# Patient Record
Sex: FEMALE | Race: BLACK OR AFRICAN AMERICAN | Employment: FULL TIME | URBAN - METROPOLITAN AREA
[De-identification: names, ages, dates, MRNs, and addresses within clinical notes are randomized per-mention and may not be internally consistent; named-entity substitution may affect disease eponyms.]

---

## 2020-10-21 ENCOUNTER — HOSPITAL ENCOUNTER (EMERGENCY)
Age: 35
Discharge: HOME | End: 2020-10-21
Payer: MEDICAID

## 2020-10-21 VITALS
SYSTOLIC BLOOD PRESSURE: 158 MMHG | OXYGEN SATURATION: 97 % | DIASTOLIC BLOOD PRESSURE: 95 MMHG | BODY MASS INDEX: 33.38 KG/M2 | WEIGHT: 170 LBS | RESPIRATION RATE: 16 BRPM | TEMPERATURE: 98.4 F | HEART RATE: 102 BPM | HEIGHT: 60 IN

## 2020-10-21 PROCEDURE — 99282 EMERGENCY DEPT VISIT SF MDM: CPT

## 2020-10-21 PROCEDURE — 6360000002 HC RX W HCPCS: Performed by: PHYSICIAN ASSISTANT

## 2020-10-21 PROCEDURE — 96372 THER/PROPH/DIAG INJ SC/IM: CPT

## 2020-10-21 RX ORDER — TRIAMCINOLONE ACETONIDE 40 MG/ML
40 INJECTION, SUSPENSION INTRA-ARTICULAR; INTRAMUSCULAR ONCE
Status: COMPLETED | OUTPATIENT
Start: 2020-10-21 | End: 2020-10-21

## 2020-10-21 RX ORDER — PERMETHRIN 50 MG/G
CREAM TOPICAL
Qty: 1 TUBE | Refills: 0 | Status: SHIPPED | OUTPATIENT
Start: 2020-10-21 | End: 2021-03-29

## 2020-10-21 RX ORDER — METHYLPREDNISOLONE 4 MG/1
TABLET ORAL
Qty: 1 KIT | Status: SHIPPED | OUTPATIENT
Start: 2020-10-21 | End: 2020-10-27

## 2020-10-21 RX ORDER — HYDROXYZINE PAMOATE 25 MG/1
25 CAPSULE ORAL 3 TIMES DAILY PRN
Qty: 28 CAPSULE | Refills: 0 | Status: SHIPPED | OUTPATIENT
Start: 2020-10-21 | End: 2020-11-04

## 2020-10-21 RX ADMIN — TRIAMCINOLONE ACETONIDE 40 MG: 40 INJECTION, SUSPENSION INTRA-ARTICULAR; INTRAMUSCULAR at 13:15

## 2020-10-21 ASSESSMENT — ENCOUNTER SYMPTOMS
SHORTNESS OF BREATH: 0
EYE PAIN: 0
CHEST TIGHTNESS: 0
COUGH: 0
EYE REDNESS: 0
EYE DISCHARGE: 0
COLOR CHANGE: 0

## 2020-10-21 NOTE — ED PROVIDER NOTES
[facility]        Department of Emergency Medicine   ED  Provider Note  Admit Date/RoomTime: 10/21/2020 12:52 PM  ED Room: [address] [place] [address]  HPI:  10/21/20, Time: 1:19 PM EDT      The patient is a 40-year-old female presenting to the emergency department with a rash that has been present for 2 days. She states it started suddenly. It is very itchy and causing some mild pain. She states it started on her feet and lower legs and is now spread to the back of her thighs and is across both arms. She does report that she stayed in a hotel and just moved to a new apartment about a week ago but no one else in her household has a similar rash. She denies any new exposures or use of new products. She has no history of allergies and has never had a rash like this before. She denies any fevers or chills, throat swelling, nausea/vomiting, chest pain, shortness of breath. The history is provided by the patient. No  was used. REVIEW OF SYSTEMS:  Review of Systems   Constitutional: Negative for activity change, chills, fatigue and fever. Eyes: Negative for pain, discharge and redness. Respiratory: Negative for cough, chest tightness and shortness of breath. Cardiovascular: Negative for chest pain, palpitations and leg swelling. Musculoskeletal: Negative for arthralgias, joint swelling and myalgias. Skin: Positive for rash. Negative for color change, pallor and wound. Neurological: Negative for dizziness, weakness, light-headedness and headaches. Hematological: Does not bruise/bleed easily. Psychiatric/Behavioral: Negative for agitation, behavioral problems and confusion. Pertinent positives and negatives are stated within HPI, all other systems reviewed and are negative.      --------------------------------------------- PAST HISTORY ---------------------------------------------  Past Medical History:  has no past medical history on file.     Past Surgical History:  has no present. No erythema. Comments: Tiny erythematous papules across bilateral upper and lower extremities. Some small areas of excoriation. No rash to the palms or soles of the feet. No intraoral lesions. Neurological:      Mental Status: She is alert and oriented to person, place, and time. Motor: No weakness. Psychiatric:         Mood and Affect: Mood normal.         Behavior: Behavior normal.         Thought Content: Thought content normal.            ------------------------------ ED COURSE/MEDICAL DECISION MAKING----------------------  Medications   triamcinolone acetonide (KENALOG-40) injection 40 mg (40 mg Intramuscular Given 10/21/20 1315)         ED COURSE:      No orders to display         Procedures:  Procedures     Medical Decision Making:   MDM   40-year-old female presented to emergency department with a pruritic rash over her upper and lower extremities that started 2 days ago. The rash appears to be from a scabies infestation but her fiancé does not have any rash and sleeps in the same bed as her. She did stay in a hotel and moved to a new apartment. Patient will be treated prophylactically for contact dermatitis with Medrol Dosepak and Vistaril. She is also given a prescription for Elimite. She is encouraged follow-up with dermatology if her symptoms do not improve. Counseling: The emergency provider has spoken with the patient and spouse/SO and discussed todays results, in addition to providing specific details for the plan of care and counseling regarding the diagnosis and prognosis. Questions are answered at this time and they are agreeable with the plan.      --------------------------------- IMPRESSION AND DISPOSITION ---------------------------------    IMPRESSION  1. Rash    2.  Scabies        DISPOSITION  Disposition: Discharge to home  Patient condition is good      Electronically signed by Emily Morataya PA-C   DD: 10/21/20  **This report was transcribed using voice recognition software. Every effort was made to ensure accuracy; however, inadvertent computerized transcription errors may be present.   END OF ED PROVIDER NOTE         Sabina Kelsey PA-C  10/21/20 1232

## 2021-03-29 ENCOUNTER — HOSPITAL ENCOUNTER (EMERGENCY)
Age: 36
Discharge: HOME | End: 2021-03-29
Payer: MEDICAID

## 2021-03-29 VITALS
SYSTOLIC BLOOD PRESSURE: 132 MMHG | OXYGEN SATURATION: 97 % | DIASTOLIC BLOOD PRESSURE: 78 MMHG | RESPIRATION RATE: 20 BRPM | HEART RATE: 86 BPM | WEIGHT: 180 LBS | TEMPERATURE: 97.8 F | HEIGHT: 60 IN | BODY MASS INDEX: 35.34 KG/M2

## 2021-03-29 DIAGNOSIS — K08.89 DENTALGIA: ICD-10-CM

## 2021-03-29 DIAGNOSIS — K02.9 DENTAL CARIES: Primary | ICD-10-CM

## 2021-03-29 PROCEDURE — 6370000000 HC RX 637 (ALT 250 FOR IP): Performed by: NURSE PRACTITIONER

## 2021-03-29 PROCEDURE — 99283 EMERGENCY DEPT VISIT LOW MDM: CPT

## 2021-03-29 RX ORDER — IBUPROFEN 600 MG/1
600 TABLET ORAL EVERY 6 HOURS PRN
Qty: 28 TABLET | Refills: 0 | Status: SHIPPED | OUTPATIENT
Start: 2021-03-29 | End: 2022-03-13 | Stop reason: ALTCHOICE

## 2021-03-29 RX ORDER — IBUPROFEN 400 MG/1
400 TABLET ORAL ONCE
Status: COMPLETED | OUTPATIENT
Start: 2021-03-29 | End: 2021-03-29

## 2021-03-29 RX ORDER — AMOXICILLIN 250 MG/1
500 CAPSULE ORAL ONCE
Status: COMPLETED | OUTPATIENT
Start: 2021-03-29 | End: 2021-03-29

## 2021-03-29 RX ORDER — CHLORHEXIDINE GLUCONATE 0.12 MG/ML
15 RINSE ORAL 2 TIMES DAILY
Qty: 210 ML | Refills: 0 | Status: SHIPPED | OUTPATIENT
Start: 2021-03-29 | End: 2021-04-05

## 2021-03-29 RX ORDER — AMOXICILLIN 875 MG/1
875 TABLET, COATED ORAL 2 TIMES DAILY
Qty: 20 TABLET | Refills: 0 | Status: SHIPPED | OUTPATIENT
Start: 2021-03-29 | End: 2021-04-08

## 2021-03-29 RX ADMIN — IBUPROFEN 400 MG: 400 TABLET, FILM COATED ORAL at 09:59

## 2021-03-29 RX ADMIN — AMOXICILLIN 500 MG: 250 CAPSULE ORAL at 09:59

## 2021-03-29 ASSESSMENT — PAIN DESCRIPTION - LOCATION: LOCATION: FACE

## 2021-03-29 NOTE — ED PROVIDER NOTES
dental carry with no obvious drainage or dental abscess. Trismus: mild         Drooling: No.           Airway stridor: No.  · Facial skin: bilateral normal exam; no erythema, swelling or tenderness. · Respiratory:  Clear to auscultation and breath sounds equal.  No increased work of breathing or respiratory distress. · CV: Regular rate and rhythm, normal heart sounds, without pathological murmurs, ectopy, gallops, or rubs. · Skin:  No rashes, erythema or lesions present, unless noted elsewhere. .  · Lymphatics: No lymphangitis or adenopathy noted. · Neurological:  Oriented. Motor functions intact. Lab / Imaging Results   (All laboratory and radiology results have been personally reviewed by myself)  Labs:  No results found for this visit on 03/29/21. Imaging: All Radiology results interpreted by Radiologist unless otherwise noted. No orders to display     ED Course / Medical Decision Making     Medications   amoxicillin (AMOXIL) capsule 500 mg (500 mg Oral Given 3/29/21 0959)   ibuprofen (ADVIL;MOTRIN) tablet 400 mg (400 mg Oral Given 3/29/21 0959)        Consult(s):   Dental Resident was not consulted to see patient regarding complaint. Procedure(s):    none. Counseling/MDM:    I discussed todays visit summary with patient,  in addition to providing specific details for the plan of care and counseling regarding the diagnosis and prognosis. Questions are answered at this time and they are agreeable with the plan. She was started on amoxicillin, Peridex and given a prescription for Motrin 600. Referral to the dental clinic which she will call today to arrange an appointment. She was also given information on how to go as an emergency walk-in. She is well-appearing, nontoxic and without airway compromise which she is appropriate for outpatient treatment and management despite her mild trismus secondary to pain.   She is aware of signs and symptoms to watch for indicative of reevaluation in the emergency department setting. Patient is amenable to this plan, given her first doses of medication in the emergency department and appreciative for her care today. She was also provided with a work note. Patient departed in stable condition. Assessment      1. Dental caries    2. [address] [address]      Plan   Discharge to home. Patient condition is stable    New Medications     New Prescriptions    AMOXICILLIN (AMOXIL) 875 MG TABLET    Take 1 tablet by mouth 2 times daily for 10 days    CHLORHEXIDINE (PERIDEX) 0.12 % SOLUTION    Take 15 mLs by mouth 2 times daily for 7 days    IBUPROFEN (IBU) 600 MG TABLET    Take 1 tablet by mouth every 6 hours as needed for Pain     Electronically signed by JUMANA Johnson CNP   DD: 3/29/21  **This report was transcribed using voice recognition software. Every effort was made to ensure accuracy; however, inadvertent computerized transcription errors may be present.   END OF ED PROVIDER NOTE       JUMANA Johnson CNP  03/29/21 5047

## 2022-03-13 ENCOUNTER — HOSPITAL ENCOUNTER (EMERGENCY)
Age: 37
Discharge: HOME | End: 2022-03-13
Attending: STUDENT IN AN ORGANIZED HEALTH CARE EDUCATION/TRAINING PROGRAM
Payer: MEDICARE

## 2022-03-13 VITALS
RESPIRATION RATE: 18 BRPM | TEMPERATURE: 98.2 F | SYSTOLIC BLOOD PRESSURE: 169 MMHG | OXYGEN SATURATION: 98 % | HEART RATE: 82 BPM | DIASTOLIC BLOOD PRESSURE: 90 MMHG

## 2022-03-13 DIAGNOSIS — K08.89 PAIN, DENTAL: Primary | ICD-10-CM

## 2022-03-13 DIAGNOSIS — K02.9 DENTAL CARIES: ICD-10-CM

## 2022-03-13 PROCEDURE — 96372 THER/PROPH/DIAG INJ SC/IM: CPT

## 2022-03-13 PROCEDURE — 6370000000 HC RX 637 (ALT 250 FOR IP): Performed by: STUDENT IN AN ORGANIZED HEALTH CARE EDUCATION/TRAINING PROGRAM

## 2022-03-13 PROCEDURE — 6360000002 HC RX W HCPCS: Performed by: STUDENT IN AN ORGANIZED HEALTH CARE EDUCATION/TRAINING PROGRAM

## 2022-03-13 PROCEDURE — 99282 EMERGENCY DEPT VISIT SF MDM: CPT

## 2022-03-13 RX ORDER — IBUPROFEN 800 MG/1
800 TABLET ORAL 2 TIMES DAILY PRN
Qty: 28 TABLET | Refills: 0 | Status: SHIPPED | OUTPATIENT
Start: 2022-03-13 | End: 2022-03-27

## 2022-03-13 RX ORDER — AMOXICILLIN AND CLAVULANATE POTASSIUM 875; 125 MG/1; MG/1
1 TABLET, FILM COATED ORAL ONCE
Status: COMPLETED | OUTPATIENT
Start: 2022-03-13 | End: 2022-03-13

## 2022-03-13 RX ORDER — AMOXICILLIN AND CLAVULANATE POTASSIUM 875; 125 MG/1; MG/1
1 TABLET, FILM COATED ORAL 2 TIMES DAILY
Qty: 20 TABLET | Refills: 0 | Status: SHIPPED | OUTPATIENT
Start: 2022-03-13 | End: 2022-03-23

## 2022-03-13 RX ORDER — CHLORHEXIDINE GLUCONATE 0.12 MG/ML
15 RINSE ORAL 2 TIMES DAILY
Qty: 420 ML | Refills: 0 | Status: SHIPPED | OUTPATIENT
Start: 2022-03-13 | End: 2022-03-27

## 2022-03-13 RX ORDER — KETOROLAC TROMETHAMINE 30 MG/ML
15 INJECTION, SOLUTION INTRAMUSCULAR; INTRAVENOUS ONCE
Status: COMPLETED | OUTPATIENT
Start: 2022-03-13 | End: 2022-03-13

## 2022-03-13 RX ADMIN — AMOXICILLIN AND CLAVULANATE POTASSIUM 1 TABLET: 875; 125 TABLET, FILM COATED ORAL at 07:57

## 2022-03-13 RX ADMIN — KETOROLAC TROMETHAMINE 15 MG: 30 INJECTION, SOLUTION INTRAMUSCULAR; INTRAVENOUS at 07:56

## 2022-03-13 ASSESSMENT — ENCOUNTER SYMPTOMS
BACK PAIN: 0
EYE PAIN: 0
ABDOMINAL PAIN: 0
NAUSEA: 0
TROUBLE SWALLOWING: 0
SORE THROAT: 0
VOMITING: 0
VOICE CHANGE: 0
SHORTNESS OF BREATH: 0
DIARRHEA: 0

## 2022-03-13 ASSESSMENT — PAIN SCALES - GENERAL: PAINLEVEL_OUTOF10: 8

## 2022-03-13 NOTE — ED PROVIDER NOTES
[facility] ENCOUNTER      Pt Name: Angela Chandler  MRN: [id number]  [place] 1985  Date of evaluation: 3/13/2022      CHIEF COMPLAINT       Chief Complaint   Patient presents with    Dental Pain     States she has Hx wisdom tooth pain she states she doesnt have any antibiotics, its impacted, and she cant take the pain anymore        HPI  Angela Chandler is a 39 y.o. female presenting to the emergency department with complaint of dental pain. Patient states she has a history wisdom tooth pain for the past few years. She last took a course of Amoxil 2 weeks ago. Patient states over the past 2 days she has had increasingly worse pain in her right lower and left upper teeth. She has been taking Tylenol and ibuprofen which has not been improving her pain recently. She also complains of radiation of her pain into her right ear and right jaw. Patient has been packing her tooth with feeling that she bought over-the-counter which provides minimal relief. She states she has a dentist appointment scheduled for this upcoming Thursday. She last saw a dentist at the emergency clinic last Thursday, and states they did not give her any antibiotics at that time. Patient denies any fevers, nausea, vomiting, difficulty swallowing secretions, chest pain, or shortness of breath. Except as noted above the remainder of the review of systems was reviewed and negative. Review of Systems   Constitutional: Negative for chills and fever. HENT: Positive for dental problem and ear pain. Negative for sore throat, trouble swallowing and voice change. Eyes: Negative for pain. Respiratory: Negative for shortness of breath. Cardiovascular: Negative for chest pain. Gastrointestinal: Negative for abdominal pain, diarrhea, nausea and vomiting. Genitourinary: Negative for flank pain.    Musculoskeletal: Negative for back pain and neck pain. Skin: Negative for rash. Neurological: Negative for headaches. Psychiatric/Behavioral: Negative for behavioral problems. The patient is not nervous/anxious. Physical Exam  Constitutional:       General: She is in acute distress. Appearance: Normal appearance. She is not ill-appearing, toxic-appearing or diaphoretic. HENT:      Head: Normocephalic and atraumatic. Right Ear: Tympanic membrane and external ear normal.      Left Ear: Tympanic membrane and external ear normal.      Nose: Nose normal.      Mouth/Throat:      Mouth: Mucous membranes are moist.      Dentition: Abnormal dentition. Dental tenderness, gingival swelling and dental caries present. Pharynx: Oropharynx is clear. Uvula midline. No oropharyngeal exudate. Tonsils: 0 on the right. 0 on the left. Eyes:      Extraocular Movements: Extraocular movements intact. Conjunctiva/sclera: Conjunctivae normal.      Pupils: Pupils are equal, round, and reactive to light. Cardiovascular:      Rate and Rhythm: Normal rate. Pulses: Normal pulses. Pulmonary:      Effort: No respiratory distress. Breath sounds: Normal breath sounds. Abdominal:      Palpations: Abdomen is soft. Tenderness: There is no abdominal tenderness. There is no guarding or rebound. Musculoskeletal:         General: No deformity or signs of injury. Cervical back: Neck supple. Tenderness (Tenderness palpation of the right upper neck.) present. No rigidity. Skin:     General: Skin is warm and dry. Capillary Refill: Capillary refill takes less than 2 seconds. Neurological:      General: No focal deficit present. Mental Status: She is alert and oriented to person, place, and time. Mental status is at baseline. Psychiatric:         Mood and Affect: Mood normal.          Procedures     MDM   Patient is a 30-year-old female presented with complaint of dental pain.   Patient was hypertensive with otherwise stable vital signs on arrival.  She appeared in minimal acute distress with severe tenderness palpation of tooth #32. Patient already has a appointment with her dentist scheduled for this upcoming Thursday. She was given a shot of Toradol for pain relief. Patient was discharged with Augmentin, ibuprofen, and chlorhexidine mouthwash. Patient is awake alert, hemodynamic stable, afebrile and in no respiratory distress. Discussed with patient plan for close outpatient follow-up with the patient's PCP as well as return precautions and the patient understands and agrees to the plan.                  --------------------------------------------- PAST HISTORY ---------------------------------------------  Past Medical History:  has no past medical history on file. Past Surgical History:  has no past surgical history on file. Social History:  reports that she has never smoked. She has never used smokeless tobacco. She reports previous alcohol use. She reports that she does not use drugs. Family History: family history is not on file. The patients home medications have been reviewed. Allergies: Patient has no known allergies. -------------------------------------------------- RESULTS -------------------------------------------------  Labs:  No results found for this visit on 03/13/22. Radiology:  No orders to display       ------------------------- NURSING NOTES AND VITALS REVIEWED ---------------------------  Date / Time Roomed:  3/13/2022  7:33 AM  ED Bed Assignment:  ST05/ST-05    The nursing notes within the ED encounter and vital signs as below have been reviewed.    BP (!) 169/90   Pulse 82   Temp 98.2 °F (36.8 °C) (Oral)   Resp 18   SpO2 98%   Oxygen Saturation Interpretation: normal      ------------------------------------------ PROGRESS NOTES ------------------------------------------    I have spoken with the patient and discussed todays results, in addition to providing specific details for the plan of care and counseling regarding the diagnosis and prognosis. Their questions are answered at this time and they are agreeable with the plan. I discussed at length with them reasons for immediate return here for re evaluation. They will followup with their PCP by calling their office tomorrow. --------------------------------- ADDITIONAL PROVIDER NOTES ---------------------------------  At this time the patient is without objective evidence of an acute process requiring hospitalization or inpatient management. They have remained hemodynamically stable throughout their entire ED visit and are stable for discharge with outpatient follow-up. The plan has been discussed in detail and they are aware of the specific conditions for emergent return, as well as the importance of follow-up. Discharge Medication List as of 3/13/2022  8:06 AM      START taking these medications    Details   amoxicillin-clavulanate (AUGMENTIN) 875-125 MG per tablet Take 1 tablet by mouth 2 times daily for 10 days, Disp-20 tablet, R-0Print      chlorhexidine (PERIDEX) 0.12 % solution Take 15 mLs by mouth 2 times daily for 14 days, Disp-420 mL, R-0Print      ibuprofen (ADVIL;MOTRIN) 800 MG tablet Take 1 tablet by mouth 2 times daily as needed for Pain, Disp-28 tablet, R-0Print             Diagnosis:  1. Pain, dental    2. Dental caries        Disposition:  Patient's disposition: Discharge to home  Patient's condition is stable.         Efraín Amador,   Resident  03/13/22 5163

## 2023-03-19 ENCOUNTER — HOSPITAL ENCOUNTER (OUTPATIENT)
Dept: MRI IMAGING | Age: 38
Discharge: HOME | End: 2023-03-21
Payer: MEDICAID

## 2023-03-19 DIAGNOSIS — E23.6 PITUITARY APOPLEXY (HCC): ICD-10-CM

## 2023-03-19 DIAGNOSIS — R90.89 ABNORMAL BRAIN MRI: ICD-10-CM

## 2023-03-19 PROCEDURE — 70545 MR ANGIOGRAPHY HEAD W/DYE: CPT

## 2023-03-19 PROCEDURE — A9579 GAD-BASE MR CONTRAST NOS,1ML: HCPCS | Performed by: RADIOLOGY

## 2023-03-19 PROCEDURE — 6360000004 HC RX CONTRAST MEDICATION: Performed by: RADIOLOGY

## 2023-03-19 PROCEDURE — 70553 MRI BRAIN STEM W/O & W/DYE: CPT

## 2023-03-19 RX ADMIN — GADOTERIDOL 16 ML: 279.3 INJECTION, SOLUTION INTRAVENOUS at 12:40

## 2023-03-20 ENCOUNTER — OFFICE VISIT (OUTPATIENT)
Dept: OBGYN | Age: 38
End: 2023-03-20
Payer: MEDICAID

## 2023-03-20 VITALS
DIASTOLIC BLOOD PRESSURE: 86 MMHG | HEART RATE: 105 BPM | SYSTOLIC BLOOD PRESSURE: 126 MMHG | HEIGHT: 61 IN | WEIGHT: 174.6 LBS | BODY MASS INDEX: 32.97 KG/M2

## 2023-03-20 DIAGNOSIS — Z87.42 HX OF ABNORMAL CERVICAL PAP SMEAR: ICD-10-CM

## 2023-03-20 DIAGNOSIS — Z12.4 PAP SMEAR FOR CERVICAL CANCER SCREENING: ICD-10-CM

## 2023-03-20 DIAGNOSIS — R90.89 ABNORMAL FINDING ON MRI OF BRAIN: ICD-10-CM

## 2023-03-20 DIAGNOSIS — N89.8 VAGINAL DRYNESS: Primary | ICD-10-CM

## 2023-03-20 PROCEDURE — 99203 OFFICE O/P NEW LOW 30 MIN: CPT | Performed by: OBSTETRICS & GYNECOLOGY

## 2023-03-20 RX ORDER — OMEPRAZOLE 40 MG/1
CAPSULE, DELAYED RELEASE ORAL
COMMUNITY
Start: 2023-01-18

## 2023-03-20 RX ORDER — VALACYCLOVIR HYDROCHLORIDE 500 MG/1
TABLET, FILM COATED ORAL
COMMUNITY
Start: 2023-01-12

## 2023-03-20 RX ORDER — METRONIDAZOLE 7.5 MG/G
GEL VAGINAL
COMMUNITY
Start: 2022-09-23

## 2023-03-20 SDOH — ECONOMIC STABILITY: INCOME INSECURITY: HOW HARD IS IT FOR YOU TO PAY FOR THE VERY BASICS LIKE FOOD, HOUSING, MEDICAL CARE, AND HEATING?: NOT VERY HARD

## 2023-03-20 SDOH — ECONOMIC STABILITY: FOOD INSECURITY: WITHIN THE PAST 12 MONTHS, YOU WORRIED THAT YOUR FOOD WOULD RUN OUT BEFORE YOU GOT MONEY TO BUY MORE.: NEVER TRUE

## 2023-03-20 SDOH — ECONOMIC STABILITY: FOOD INSECURITY: WITHIN THE PAST 12 MONTHS, THE FOOD YOU BOUGHT JUST DIDN'T LAST AND YOU DIDN'T HAVE MONEY TO GET MORE.: NEVER TRUE

## 2023-03-20 SDOH — ECONOMIC STABILITY: HOUSING INSECURITY
IN THE LAST 12 MONTHS, WAS THERE A TIME WHEN YOU DID NOT HAVE A STEADY PLACE TO SLEEP OR SLEPT IN A SHELTER (INCLUDING NOW)?: NO

## 2023-03-20 NOTE — PROGRESS NOTES
New patient here today to establish care. Previously went to [facility] in [place]. Last pap was before covid. LMP 3/9/23. Has had a tubal in 2013. Had some abnormal paps in the past. Has some concerns with vaginal dryness which she uses lubricants for and then it causes BV. Assisted with pelvic exam, pap smear obtained, labeled  and delivered to lab. Directed to the lab to obtain ordered labs. Discharge instructions have been discussed with the patient. Patient advised to call our office with any questions or concerns. Voiced understanding.

## 2023-03-20 NOTE — PROGRESS NOTES
Chief complaint: 40 year- old presents for well woman exam. This is a new patient to me as well as to the [facility]. History:    G4, P4,Ab0, vaginal deliveries. .  Doing well. Periods:  irregular menses. Monthly, last 111-12 days or 2 a month that last for 2 days. .  Sexually active: yes . No abdominal or pelvic pain. No dyspareunia. No abnormal vaginal  discharge. Previous Pap smears normal. Last Pap smear in [place] several years ago,  History of some abnormals? . Has a colposcopy as well as biopsy in the past at [facility]. No urinary or GI symptoms. Medical/ surgical history and medications reviewed. Has had a tubal ligation in the past.  Being worked up by Everardo Lowe for a possible brain lesion. Had MRI yesterday. Has also had them done at [facility] as well. Possible adenoma was noted at [facility] in February. Denies breast leakage. ROS:Negative    Examination:    Vital signs reviewed. GA: Healthy. Oriented x 3 no distress. HEENT: hearing grossly intact, no jaundice, no oral lesions or nasal discharge. Neck: Supple, no masses or lymphadenopathy. Thyroid: normal, no goiter. Abdomen: Soft. No masses. No organomegaly. Vulva:Normal  Vagina:Clear, I see no signs if infection  Urethra:Normal  Bladder:Normal  Pelvic Support  Cervix: No lesions or mucopurulent discharge, pap smear done as a TPP with co-testing. Uterus: Normal size. Adnexa: Free, no masses or tenderness. Perineum:Normal  Anus:Normal      IMP: Vaginal dryness, abn vaginal bleeding, possible adenoma. PLAN: Prolactin, LH, FSH, Estradiol level    RTC 2-3 weeks.

## 2023-03-23 LAB
HPV HR 12 DNA SPEC QL NAA+PROBE: NOT DETECTED
HPV SAMPLE: NORMAL
HPV16 DNA SPEC QL NAA+PROBE: NOT DETECTED
HPV16+18+H RISK 12 DNA CVX-IMP: NORMAL
HPV18 DNA SPEC QL NAA+PROBE: NOT DETECTED
SPECIMEN SOURCE: NORMAL

## 2023-06-14 ENCOUNTER — TELEPHONE (OUTPATIENT)
Dept: ADMINISTRATIVE | Age: 38
End: 2023-06-14

## 2023-06-14 NOTE — TELEPHONE ENCOUNTER
Pt called and requested to schedule an appt with neurology. She was seen yesterday in ER for migraines and told to schedule. She was previously scheduled to see Kalee Necessary on 6/30, however the appt was cancelled by the office stating she needed to see neurosurgery first and a referral was placed to neurosurgery with a diagnosis of pituitary adenoma. Is pt able to schedule with neurology for migraines or does she still need to see neurosurgery first?  Please advise and contact pt.

## 2023-11-01 ENCOUNTER — HOSPITAL ENCOUNTER (EMERGENCY)
Facility: HOSPITAL | Age: 38
Discharge: HOME | End: 2023-11-01
Payer: MEDICAID

## 2023-11-01 VITALS
BODY MASS INDEX: 32.39 KG/M2 | DIASTOLIC BLOOD PRESSURE: 86 MMHG | SYSTOLIC BLOOD PRESSURE: 142 MMHG | OXYGEN SATURATION: 100 % | RESPIRATION RATE: 18 BRPM | HEART RATE: 74 BPM | HEIGHT: 60 IN | TEMPERATURE: 98.7 F | WEIGHT: 165 LBS

## 2023-11-01 DIAGNOSIS — M54.50 LOW BACK PAIN WITHOUT SCIATICA, UNSPECIFIED BACK PAIN LATERALITY, UNSPECIFIED CHRONICITY: Primary | ICD-10-CM

## 2023-11-01 PROCEDURE — 2500000004 HC RX 250 GENERAL PHARMACY W/ HCPCS (ALT 636 FOR OP/ED)

## 2023-11-01 PROCEDURE — 2500000001 HC RX 250 WO HCPCS SELF ADMINISTERED DRUGS (ALT 637 FOR MEDICARE OP)

## 2023-11-01 PROCEDURE — 4500999001 HC ED NO CHARGE

## 2023-11-01 PROCEDURE — 96372 THER/PROPH/DIAG INJ SC/IM: CPT

## 2023-11-01 PROCEDURE — 2500000005 HC RX 250 GENERAL PHARMACY W/O HCPCS

## 2023-11-01 PROCEDURE — 99283 EMERGENCY DEPT VISIT LOW MDM: CPT | Mod: 25

## 2023-11-01 PROCEDURE — 99284 EMERGENCY DEPT VISIT MOD MDM: CPT

## 2023-11-01 RX ORDER — LIDOCAINE 560 MG/1
2 PATCH PERCUTANEOUS; TOPICAL; TRANSDERMAL ONCE
Status: DISCONTINUED | OUTPATIENT
Start: 2023-11-01 | End: 2023-11-01 | Stop reason: HOSPADM

## 2023-11-01 RX ORDER — NAPROXEN 500 MG/1
500 TABLET ORAL
Qty: 20 TABLET | Refills: 0 | Status: SHIPPED | OUTPATIENT
Start: 2023-11-01 | End: 2023-11-03

## 2023-11-01 RX ORDER — KETOROLAC TROMETHAMINE 30 MG/ML
15 INJECTION, SOLUTION INTRAMUSCULAR; INTRAVENOUS ONCE
Status: COMPLETED | OUTPATIENT
Start: 2023-11-01 | End: 2023-11-01

## 2023-11-01 RX ORDER — KETOROLAC TROMETHAMINE 30 MG/ML
15 INJECTION, SOLUTION INTRAMUSCULAR; INTRAVENOUS ONCE
Status: DISCONTINUED | OUTPATIENT
Start: 2023-11-01 | End: 2023-11-01

## 2023-11-01 RX ORDER — CYCLOBENZAPRINE HCL 5 MG
5 TABLET ORAL ONCE
Status: COMPLETED | OUTPATIENT
Start: 2023-11-01 | End: 2023-11-01

## 2023-11-01 RX ORDER — CYCLOBENZAPRINE HCL 10 MG
10 TABLET ORAL
Qty: 5 TABLET | Refills: 0 | Status: SHIPPED | OUTPATIENT
Start: 2023-11-01 | End: 2023-11-03

## 2023-11-01 RX ADMIN — LIDOCAINE 2 PATCH: 4 PATCH TOPICAL at 21:03

## 2023-11-01 RX ADMIN — CYCLOBENZAPRINE HYDROCHLORIDE 5 MG: 5 TABLET, FILM COATED ORAL at 21:03

## 2023-11-01 RX ADMIN — KETOROLAC TROMETHAMINE 15 MG: 30 INJECTION, SOLUTION INTRAMUSCULAR at 21:13

## 2023-11-01 ASSESSMENT — COLUMBIA-SUICIDE SEVERITY RATING SCALE - C-SSRS
6. HAVE YOU EVER DONE ANYTHING, STARTED TO DO ANYTHING, OR PREPARED TO DO ANYTHING TO END YOUR LIFE?: NO
2. HAVE YOU ACTUALLY HAD ANY THOUGHTS OF KILLING YOURSELF?: NO
1. IN THE PAST MONTH, HAVE YOU WISHED YOU WERE DEAD OR WISHED YOU COULD GO TO SLEEP AND NOT WAKE UP?: NO

## 2023-11-01 ASSESSMENT — PAIN - FUNCTIONAL ASSESSMENT: PAIN_FUNCTIONAL_ASSESSMENT: 0-10

## 2023-11-01 ASSESSMENT — PAIN SCALES - GENERAL: PAINLEVEL_OUTOF10: 8

## 2023-11-01 NOTE — Clinical Note
Taylor Jerez was seen and treated in our emergency department on 11/1/2023.  She may return to work on 11/06/2023.  May return to work earlier if patient feels ready.     If you have any questions or concerns, please don't hesitate to call.      Mary Delatorre PA-C

## 2023-11-02 ENCOUNTER — HOSPITAL ENCOUNTER (EMERGENCY)
Facility: HOSPITAL | Age: 38
Discharge: HOME | End: 2023-11-01
Payer: MEDICAID

## 2023-11-02 NOTE — ED PROVIDER NOTES
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 Unrelieved by ibuprofen.        HPI    HPI: This is a 38 year old female who presents to the ER complaining of low back pain for the past 2 to 3 days.  She reports pain on both sides of the low back, she states she feels her back spasm intermittently as well.  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  She states that she believes this is what caused her current pain, she has had flares of this pain many times in the past which have been related to similar activity.  She has been taking ibuprofen which provides some relief.  Feels she needs a muscle relaxer and a few days off work.  She denies any specific trauma or injury.  Denies any other associated symptoms or complaints.  She has no radiation of the pain into the glutes, lower extremities, groin, abdomen, or mid or upper back.  She has no lower extremity numbness, tingling, or weakness.  No gait instability.  She denies any urinary symptoms, has no dysuria, hematuria, urinary urgency or frequency, denies loss of bowel or bladder control, no urinary incontinence or retention.  Has no fevers or chills.  No abdominal pain, no chest pain or shortness of breath, no nausea or vomiting.  No vaginal complaints.  Denies chance of pregnancy.  No other complaints or symptoms voiced.      ROS:  General: No decreased responsiveness, confusion, no decreased appetite or fluids, no fever, chills  Neuro: no headache, lightheadedness or dizziness, no numbness or tingling, no saddle anesthesia, no loss of bowel or bladder  ENMT: No sore throat  Eyes: No blurry vision  Skel: No joint pain, no neck pain, +back pain  Cardiac: No chest pain or palpitations  Resp: No shortness of breath, wheezing or cough  GI: No abdominal  pain, nausea vomiting or diarrhea  : No dysuria, urgency or frequency, no flank pain  Skin: no rash or wounds  Heme: no bruising, bleeding or petechiae    PMH: denies  Social History: no smoker, no EtOH, no drugs  Family History: Noncontributory    Physical Exam:  General: Vital signs stable, Pt is alert, no acute distress  Eyes: Conjunctiva normal, PERRL, EOMs intact  HENMT: Normocephalic, atraumatic, external ears and nose normal, no scars or masses. Moist mucous membranes. Trachea is midline. No meningeal signs, negative Kernig and Brudzinski, moves neck freely.  Resp: Respiratory effort is normal, no retractions, no stridor.  Lungs CTA, no wheezes or rhonchi  CV: Heart is regular rate and rhythm. No peripheral edema  GI: Abdomen is soft nontender to palpation no guarding or rigidity.  : No CVA tenderness  Skin: No evidence of trauma, skin is warm and dry. No rashes, lesions or ulcers.  Skel: full range of motion of upper and lower extremities. No midline tenderness over the cervical thoracic or lumbar spine.  +reproducible tenderness over the paraspinal musculature and into the lumbar muscles. 5/5 strength bilaterally with hip flexion, knee flexion and extension, ankle dorsiflexion and plantarflexion, and great toe dorsiflexion.  Patellar reflexes intact bilaterally. Sensation intact and equal throughout trunk and BLE. No motor or sensory deficit.  Neuro: Normal gait, CN II-XII intact, no motor or sensory changes  Psych: Alert and oriented ×3, judgment is appropriate, normal mood and affect           No data recorded                Patient History   Past Medical History:   Diagnosis Date    Dorsalgia, unspecified     Chronic back pain    Encounter for screening for human papillomavirus (HPV) 06/23/2021    Screening for HPV (human papillomavirus)    Encounter for screening for malignant neoplasm of cervix 06/23/2021    Screening for cervical cancer    Excessive and frequent menstruation with irregular cycle  08/18/2021    Menorrhagia with irregular cycle    Headache, unspecified     Frequent headaches    Other conditions influencing health status     Right handed    Other conditions influencing health status 11/15/2016    History of pregnancy    Personal history of other diseases of the female genital tract 08/12/2021    History of dysfunctional uterine bleeding    Personal history of other diseases of the female genital tract 08/12/2021    History of dysmenorrhea    Personal history of other diseases of the nervous system and sense organs 09/08/2016    History of migraine    Sleep disorder, unspecified     Sleep disturbances    Unspecified disorder of nose and nasal sinuses     Sinus trouble     Past Surgical History:   Procedure Laterality Date    HAND TENDON SURGERY  07/27/2016    Hand Incision Tendon Sheath Of A Finger    OTHER SURGICAL HISTORY  08/29/2022    Total hysterectomy laparoscopic    OTHER SURGICAL HISTORY  07/28/2021    Excision of vaginal cyst    OTHER SURGICAL HISTORY  10/06/2021    Bilateral salpingectomy    OTHER SURGICAL HISTORY  09/08/2016    Injection Of Trigger Point(S)     No family history on file.  Social History     Tobacco Use    Smoking status: Not on file    Smokeless tobacco: Not on file   Substance Use Topics    Alcohol use: Not on file    Drug use: Not on file       Physical Exam   ED Triage Vitals [11/01/23 1945]   Temp Heart Rate Resp BP   37.1 °C (98.7 °F) 93 16 153/90      SpO2 Temp Source Heart Rate Source Patient Position   99 % Tympanic -- --      BP Location FiO2 (%)     -- --       Physical Exam    ED Course & MDM   Diagnoses as of 11/01/23 2057   Low back pain without sciatica, unspecified back pain laterality, unspecified chronicity       Medical Decision Making    ED course / MDM     Summary:  Patient presented with low back pain for the past 2 to 3 days.  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  Vital signs are stable, patient appears nontoxic.  On exam, there is reproducible tenderness over the bilateral lumbar paraspinal musculature, no midline spinal tenderness, neurologic exam is unremarkable, no motor or sensory deficits to lower extremities, normal gait, no ataxia, no urinary symptoms, no loss of bowel or bladder control.  No evidence of cauda equina or cord compression.  She has no fevers or chills, no midline tenderness, no trauma or injury, doubt osteomyelitis or fracture. Results and differential were discussed in detail with the patient. Did consider imaging, but in absence of any other findings and a reassuring exam with muscular tenderness, patient agrees imaging is unlikely to .  She is requesting a work note and muscle relaxers.  Symptoms are most consistent with a musculoskeletal etiology such as a back strain.  She was given a dose of Toradol, Flexeril, and a lidocaine patch in the ED, which significantly improved her symptoms.  Agrees with the plan for discharge with symptomatic treatment and close outpatient follow-up.  Prescriptions written for naproxen, Flexeril, and lidocaine patches. Patient was given strict return precautions, understands reasons to return to the ED. Also discussed supportive care instructions. I expressed the importance of outpatient follow up with their PCP. All questions were answered, patient expressed understanding and stated that they would comply.    I have discussed with the patient my clinical impression and the result of an evidence-based clinical evaluation to screen for spinal epidural abscess and other spinal emergencies, as well as the risk of further testing and hospitalization. The evidence shows that the risk for an acute spinal  emergency is less than 1%. Although the risk of an acute spinal emergency has not been completely eliminated, the risks of further testing likely exceed any potential benefit, and the patient agrees with not pursuing further emergent evaluation for causes of back pain at this time.    Patient was advised to follow up with PCP or recommended provider in 2-3 days for another evaluation and exam. I advised patient and family/friend/caregiver/guardian to return or go to closest emergency room immediately if symptoms change, get worse, new symptoms develop prior to follow up. If there is no improvement in symptoms in the next 24 hours they are advised to return for further evaluation and exam. I also explained the plan and treatment course. Patient and family/friend/caregiver/guardian is in agreement with plan, treatment course, and follow up and states verbally that they will comply.    Impression:  1. See diagnosis    Plan: Homegoing. I discussed the differential, results, and discharge plan with the patient and family/friend/caregiver. I emphasized the importance of follow-up with the physician I referred them to in the timeframe recommended.  I explained reasons for the patient to return to the Emergency Department. They agreed that if they feel their condition is worsening or if they have any other concern they should call 911 immediately for further assistance. We also discussed medications that were prescribed including common side effects and interactions. The patient was advised to abstain from driving, operating heavy machinery, or making significant decisions while taking medications such as opiates and muscle relaxers that may impair this. I gave the patient an opportunity to ask all questions they had and answered all of them accordingly. They understand return precautions and discharge instructions. The patient and family/friend/caregiver expressed understanding verbally and that they would comply.        Disposition: Discharge    This note has been transcribed using voice recognition and may contain grammatical errors, misplaced words, incorrect words, incorrect phrases or other errors.       Procedure  Procedures     Mary Delatorre PA-C  11/01/23 5602

## 2023-11-10 ENCOUNTER — HOSPITAL ENCOUNTER (EMERGENCY)
Facility: HOSPITAL | Age: 38
Discharge: HOME | End: 2023-11-11
Payer: MEDICAID

## 2023-11-10 DIAGNOSIS — J06.9 VIRAL URI WITH COUGH: Primary | ICD-10-CM

## 2023-11-10 DIAGNOSIS — R03.0 ELEVATED BP WITHOUT DIAGNOSIS OF HYPERTENSION: ICD-10-CM

## 2023-11-10 LAB
FLUAV RNA RESP QL NAA+PROBE: NOT DETECTED
FLUBV RNA RESP QL NAA+PROBE: NOT DETECTED
SARS-COV-2 RNA RESP QL NAA+PROBE: NOT DETECTED

## 2023-11-10 PROCEDURE — 87636 SARSCOV2 & INF A&B AMP PRB: CPT | Performed by: EMERGENCY MEDICINE

## 2023-11-10 PROCEDURE — 99285 EMERGENCY DEPT VISIT HI MDM: CPT

## 2023-11-10 PROCEDURE — 99283 EMERGENCY DEPT VISIT LOW MDM: CPT

## 2023-11-10 ASSESSMENT — PAIN - FUNCTIONAL ASSESSMENT: PAIN_FUNCTIONAL_ASSESSMENT: 0-10

## 2023-11-10 ASSESSMENT — COLUMBIA-SUICIDE SEVERITY RATING SCALE - C-SSRS
1. IN THE PAST MONTH, HAVE YOU WISHED YOU WERE DEAD OR WISHED YOU COULD GO TO SLEEP AND NOT WAKE UP?: NO
6. HAVE YOU EVER DONE ANYTHING, STARTED TO DO ANYTHING, OR PREPARED TO DO ANYTHING TO END YOUR LIFE?: NO
2. HAVE YOU ACTUALLY HAD ANY THOUGHTS OF KILLING YOURSELF?: NO

## 2023-11-10 ASSESSMENT — PAIN SCALES - GENERAL: PAINLEVEL_OUTOF10: 0 - NO PAIN

## 2023-11-10 NOTE — Clinical Note
Cee Jerez was seen and treated in our emergency department on 11/10/2023.  She may return to work on 11/13/2023.       If you have any questions or concerns, please don't hesitate to call.      Padmini Berger PA-C

## 2023-11-11 VITALS
SYSTOLIC BLOOD PRESSURE: 143 MMHG | OXYGEN SATURATION: 99 % | DIASTOLIC BLOOD PRESSURE: 92 MMHG | TEMPERATURE: 99.1 F | RESPIRATION RATE: 19 BRPM | HEART RATE: 89 BPM

## 2023-11-11 PROCEDURE — 2500000001 HC RX 250 WO HCPCS SELF ADMINISTERED DRUGS (ALT 637 FOR MEDICARE OP): Performed by: PHYSICIAN ASSISTANT

## 2023-11-11 RX ORDER — GUAIFENESIN 100 MG/5ML
400 SOLUTION ORAL ONCE
Status: COMPLETED | OUTPATIENT
Start: 2023-11-11 | End: 2023-11-11

## 2023-11-11 RX ORDER — PSEUDOEPHEDRINE HCL 30 MG
60 TABLET ORAL ONCE
Status: COMPLETED | OUTPATIENT
Start: 2023-11-11 | End: 2023-11-11

## 2023-11-11 RX ORDER — PSEUDOEPHEDRINE HYDROCHLORIDE 60 MG/1
60 TABLET ORAL EVERY 6 HOURS PRN
Qty: 24 TABLET | Refills: 0 | Status: SHIPPED | OUTPATIENT
Start: 2023-11-11

## 2023-11-11 RX ADMIN — PSEUDOEPHEDRINE HCL 60 MG: 30 TABLET, FILM COATED ORAL at 00:34

## 2023-11-11 RX ADMIN — GUAIFENESIN 400 MG: 200 SOLUTION ORAL at 00:35

## 2023-11-11 NOTE — DISCHARGE INSTRUCTIONS
Please follow-up with primary care provider within the next week.  Recommend talking to your primary care doctor about elevated blood pressure reading.  Continue supportive care such as rest, hydration.  Manage symptoms with over-the-counter medication such as Tylenol, ibuprofen as needed for pain or fever.  May take Sudafed as needed for congestion, runny nose.

## 2023-11-11 NOTE — ED PROVIDER NOTES
Chief Complaint   Patient presents with    Cough     Pt came in due to cough and colds started 2 days ago   HPI:   Cee Jerez is an otherwise healthy 38 y.o. female who presents to the ED with significant other for evaluation of viral type symptoms x2 days.  She endorses sore throat, cough productive of yellow sputum, runny nose with clear rhinorrhea, myalgias.  She has not tried any medication for her symptoms.  Had son travel out of town recently and she said when he came back they all got sick.  LMP began today.  Endorses chest discomfort with significant coughing but otherwise denies chest pain, shortness of breath, palpitations, dizziness or lightheadedness, syncope, abdominal pain, dysuria, hematuria, nausea, vomiting, diarrhea.    Medications: None  Soc HX: Occasional alcohol use.  Denies tobacco use.  No Known Allergies: NKDA  No past medical history on file.  Past Surgical History:   Procedure Laterality Date    CT HEAD ANGIO W AND WO IV CONTRAST  6/13/2023    CT HEAD ANGIO W AND WO IV CONTRAST 6/13/2023    CT NECK ANGIO W AND WO IV CONTRAST  6/13/2023    CT NECK ANGIO W AND WO IV CONTRAST 6/13/2023    MR HEAD ANGIO W IV CONTRAST  3/19/2023    MR HEAD ANGIO W IV CONTRAST 3/19/2023     No family history on file.     Physical Exam  Vitals and nursing note reviewed.   Constitutional:       General: She is not in acute distress.     Appearance: Normal appearance. She is not ill-appearing or toxic-appearing.   HENT:      Head: Normocephalic and atraumatic.      Right Ear: Tympanic membrane, ear canal and external ear normal.      Left Ear: Tympanic membrane, ear canal and external ear normal.      Nose: Congestion present.      Comments: No maxillary sinus tenderness to palpation     Mouth/Throat:      Mouth: Mucous membranes are moist.      Pharynx: Posterior oropharyngeal erythema present. No oropharyngeal exudate.      Comments: Uvula midline  Eyes:      Pupils: Pupils are equal, round, and reactive to  light.   Cardiovascular:      Rate and Rhythm: Normal rate and regular rhythm.      Pulses: Normal pulses.      Heart sounds: No murmur heard.  Pulmonary:      Effort: Pulmonary effort is normal. No respiratory distress.      Breath sounds: Normal breath sounds.   Musculoskeletal:         General: No deformity or signs of injury. Normal range of motion.      Cervical back: Normal range of motion.   Lymphadenopathy:      Cervical: No cervical adenopathy.   Skin:     General: Skin is warm and dry.      Capillary Refill: Capillary refill takes less than 2 seconds.      Findings: No bruising or rash.   Neurological:      General: No focal deficit present.      Mental Status: She is alert.      Cranial Nerves: No cranial nerve deficit.     VS: As documented in the triage note and EMR flowsheet from this visit were reviewed.      Medical Decision Making:   ED Course as of 11/11/23 0024   Sat Nov 11, 2023   0000 Vitals Reviewed: Temp 37.3. Hypertensive. Not tachycardic nor tachypneic. No hypoxia.   [KA]   0021 Patient is a well-appearing 38-year-old female who presents to the ED with significant other for evaluation of viral type symptoms.  She has hypertensive without diagnosis of hypertension and slightly elevated temp of 37.3 otherwise physical exam unremarkable.  No signs of otitis media, peritonsillar abscess.  Lungs are clear to auscultation with no concern for pneumonia.  COVID and flu were obtained during triage and resulted as negative prior to my assessment.  We will treat patient with Sudafed and Robitussin for symptoms.  No further labs or imaging are indicated.  Recommended patient rest, stay hydrated and continue supportive care at home.  Follow-up with primary care provider within the next week.  Return to nearest ED for any new or worsening symptoms.  She is agreeable. [KA]      ED Course User Index  [KA] Padmini Berger PA-C         Diagnoses as of 11/11/23 0024   Viral URI with cough   Elevated BP  without diagnosis of hypertension   Escalation of Care: Appropriate for outpatient management     Counseling: Spoke with the patient and discussed today´s findings, in addition to providing specific details for the plan of care and expected course.  Patient was given the opportunity to ask questions.    Discussed return precautions and importance of follow-up.  Advised to follow-up with PCP.  Advised to return to the ED for changing or worsening symptoms, new symptoms, complaint specific precautions, and precautions listed on the discharge paperwork.  Educated on the common potential side effects of medications prescribed.    I advised the patient that the emergency evaluation and treatment provided today doesn't end their need for medical care. It is very important that they follow-up with their primary care provider or other specialist as instructed.    The plan of care was mutually agreed upon with the patient. The patient and/or family were given the opportunity to ask questions. All questions asked today in the ED were answered to the best of my ability with today's information.    I specifically advised the patient to return to the ED for changing or worsening symptoms, worrisome new symptoms, or for any complaint specific precautions listed on the discharge paperwork.    This patient was cared for in the setting of nationwide stress on resources and staffing.    This report was transcribed using voice recognition software.  Every effort was made to ensure accuracy, however, inadvertently computerized transcription errors may be present.       Padmini Berger PA-C  11/11/23 0024       Padmini Berger PA-C  11/12/23 1226

## 2024-11-12 ENCOUNTER — APPOINTMENT (OUTPATIENT)
Dept: RADIOLOGY | Facility: HOSPITAL | Age: 39
End: 2024-11-12
Payer: COMMERCIAL

## 2024-11-12 PROCEDURE — 71046 X-RAY EXAM CHEST 2 VIEWS: CPT | Mod: FOREIGN READ | Performed by: RADIOLOGY

## 2024-11-12 PROCEDURE — 84702 CHORIONIC GONADOTROPIN TEST: CPT | Performed by: PHYSICIAN ASSISTANT

## 2024-11-12 PROCEDURE — 85025 COMPLETE CBC W/AUTO DIFF WBC: CPT | Performed by: PHYSICIAN ASSISTANT

## 2024-11-12 PROCEDURE — 84484 ASSAY OF TROPONIN QUANT: CPT | Performed by: PHYSICIAN ASSISTANT

## 2024-11-12 PROCEDURE — 71046 X-RAY EXAM CHEST 2 VIEWS: CPT

## 2024-11-12 PROCEDURE — 36415 COLL VENOUS BLD VENIPUNCTURE: CPT | Performed by: PHYSICIAN ASSISTANT

## 2024-11-12 PROCEDURE — 84075 ASSAY ALKALINE PHOSPHATASE: CPT | Performed by: PHYSICIAN ASSISTANT

## 2024-11-12 PROCEDURE — 99283 EMERGENCY DEPT VISIT LOW MDM: CPT | Mod: 25

## 2024-11-12 PROCEDURE — 84443 ASSAY THYROID STIM HORMONE: CPT | Performed by: PHYSICIAN ASSISTANT

## 2024-11-12 ASSESSMENT — COLUMBIA-SUICIDE SEVERITY RATING SCALE - C-SSRS
1. IN THE PAST MONTH, HAVE YOU WISHED YOU WERE DEAD OR WISHED YOU COULD GO TO SLEEP AND NOT WAKE UP?: NO
2. HAVE YOU ACTUALLY HAD ANY THOUGHTS OF KILLING YOURSELF?: NO
6. HAVE YOU EVER DONE ANYTHING, STARTED TO DO ANYTHING, OR PREPARED TO DO ANYTHING TO END YOUR LIFE?: NO

## 2024-11-12 ASSESSMENT — PAIN DESCRIPTION - LOCATION: LOCATION: HEAD

## 2024-11-12 ASSESSMENT — PAIN SCALES - GENERAL: PAINLEVEL_OUTOF10: 7

## 2024-11-12 ASSESSMENT — PAIN - FUNCTIONAL ASSESSMENT: PAIN_FUNCTIONAL_ASSESSMENT: 0-10

## 2024-11-12 ASSESSMENT — PAIN DESCRIPTION - PAIN TYPE: TYPE: ACUTE PAIN

## 2024-11-13 ENCOUNTER — HOSPITAL ENCOUNTER (EMERGENCY)
Facility: HOSPITAL | Age: 39
Discharge: HOME | End: 2024-11-13
Payer: COMMERCIAL

## 2024-11-13 VITALS
OXYGEN SATURATION: 99 % | HEART RATE: 84 BPM | DIASTOLIC BLOOD PRESSURE: 89 MMHG | WEIGHT: 169 LBS | SYSTOLIC BLOOD PRESSURE: 145 MMHG | HEIGHT: 60 IN | RESPIRATION RATE: 20 BRPM | BODY MASS INDEX: 33.18 KG/M2 | TEMPERATURE: 98.7 F

## 2024-11-13 DIAGNOSIS — F41.9 ANXIETY: ICD-10-CM

## 2024-11-13 DIAGNOSIS — F43.21 GRIEF REACTION: Primary | ICD-10-CM

## 2024-11-13 DIAGNOSIS — F43.20 ADJUSTMENT DISORDER, UNSPECIFIED TYPE: ICD-10-CM

## 2024-11-13 LAB
ALBUMIN SERPL BCP-MCNC: 4.2 G/DL (ref 3.4–5)
ALP SERPL-CCNC: 58 U/L (ref 33–110)
ALT SERPL W P-5'-P-CCNC: 15 U/L (ref 7–45)
ANION GAP SERPL CALC-SCNC: 10 MMOL/L (ref 10–20)
AST SERPL W P-5'-P-CCNC: 17 U/L (ref 9–39)
B-HCG SERPL-ACNC: <2 MIU/ML
BASOPHILS # BLD AUTO: 0.05 X10*3/UL (ref 0–0.1)
BASOPHILS NFR BLD AUTO: 0.6 %
BILIRUB SERPL-MCNC: 0.4 MG/DL (ref 0–1.2)
BUN SERPL-MCNC: 13 MG/DL (ref 6–23)
CALCIUM SERPL-MCNC: 8.5 MG/DL (ref 8.6–10.3)
CARDIAC TROPONIN I PNL SERPL HS: 5 NG/L (ref 0–13)
CARDIAC TROPONIN I PNL SERPL HS: 5 NG/L (ref 0–13)
CHLORIDE SERPL-SCNC: 108 MMOL/L (ref 98–107)
CO2 SERPL-SCNC: 24 MMOL/L (ref 21–32)
CREAT SERPL-MCNC: 0.68 MG/DL (ref 0.5–1.05)
EGFRCR SERPLBLD CKD-EPI 2021: >90 ML/MIN/1.73M*2
EOSINOPHIL # BLD AUTO: 0.13 X10*3/UL (ref 0–0.7)
EOSINOPHIL NFR BLD AUTO: 1.5 %
ERYTHROCYTE [DISTWIDTH] IN BLOOD BY AUTOMATED COUNT: 14.1 % (ref 11.5–14.5)
GLUCOSE SERPL-MCNC: 83 MG/DL (ref 74–99)
HCT VFR BLD AUTO: 43.5 % (ref 36–46)
HGB BLD-MCNC: 13.7 G/DL (ref 12–16)
IMM GRANULOCYTES # BLD AUTO: 0.01 X10*3/UL (ref 0–0.7)
IMM GRANULOCYTES NFR BLD AUTO: 0.1 % (ref 0–0.9)
LYMPHOCYTES # BLD AUTO: 3.02 X10*3/UL (ref 1.2–4.8)
LYMPHOCYTES NFR BLD AUTO: 35.7 %
MCH RBC QN AUTO: 26.8 PG (ref 26–34)
MCHC RBC AUTO-ENTMCNC: 31.5 G/DL (ref 32–36)
MCV RBC AUTO: 85 FL (ref 80–100)
MONOCYTES # BLD AUTO: 0.6 X10*3/UL (ref 0.1–1)
MONOCYTES NFR BLD AUTO: 7.1 %
NEUTROPHILS # BLD AUTO: 4.64 X10*3/UL (ref 1.2–7.7)
NEUTROPHILS NFR BLD AUTO: 55 %
NRBC BLD-RTO: 0 /100 WBCS (ref 0–0)
PLATELET # BLD AUTO: 351 X10*3/UL (ref 150–450)
POTASSIUM SERPL-SCNC: 3.7 MMOL/L (ref 3.5–5.3)
PROT SERPL-MCNC: 7 G/DL (ref 6.4–8.2)
RBC # BLD AUTO: 5.12 X10*6/UL (ref 4–5.2)
SODIUM SERPL-SCNC: 138 MMOL/L (ref 136–145)
TSH SERPL-ACNC: 1.06 MIU/L (ref 0.44–3.98)
WBC # BLD AUTO: 8.5 X10*3/UL (ref 4.4–11.3)

## 2024-11-13 PROCEDURE — 2500000001 HC RX 250 WO HCPCS SELF ADMINISTERED DRUGS (ALT 637 FOR MEDICARE OP): Performed by: PHYSICIAN ASSISTANT

## 2024-11-13 PROCEDURE — 36415 COLL VENOUS BLD VENIPUNCTURE: CPT | Performed by: PHYSICIAN ASSISTANT

## 2024-11-13 PROCEDURE — 84484 ASSAY OF TROPONIN QUANT: CPT | Performed by: PHYSICIAN ASSISTANT

## 2024-11-13 RX ORDER — HYDROXYZINE HYDROCHLORIDE 25 MG/1
25 TABLET, FILM COATED ORAL ONCE
Status: COMPLETED | OUTPATIENT
Start: 2024-11-13 | End: 2024-11-13

## 2024-11-13 RX ORDER — HYDROXYZINE HYDROCHLORIDE 25 MG/1
25 TABLET, FILM COATED ORAL EVERY 6 HOURS
Qty: 12 TABLET | Refills: 0 | Status: SHIPPED | OUTPATIENT
Start: 2024-11-13 | End: 2024-11-16

## 2024-11-13 SDOH — HEALTH STABILITY: MENTAL HEALTH: HAVE YOU EVER DONE ANYTHING, STARTED TO DO ANYTHING, OR PREPARED TO DO ANYTHING TO END YOUR LIFE?: NO

## 2024-11-13 SDOH — HEALTH STABILITY: MENTAL HEALTH: SUICIDE ASSESSMENT: ADULT (C-SSRS)

## 2024-11-13 SDOH — HEALTH STABILITY: MENTAL HEALTH: HAVE YOU WISHED YOU WERE DEAD OR WISHED YOU COULD GO TO SLEEP AND NOT WAKE UP?: NO

## 2024-11-13 SDOH — HEALTH STABILITY: MENTAL HEALTH: BEHAVIORAL HEALTH(WDL): WITHIN DEFINED LIMITS

## 2024-11-13 SDOH — HEALTH STABILITY: MENTAL HEALTH: HAVE YOU ACTUALLY HAD ANY THOUGHTS OF KILLING YOURSELF?: NO

## 2024-11-13 NOTE — Clinical Note
Cee Jerez was seen and treated in our emergency department on 11/12/2024.  She may return to work on 11/16/2024.       If you have any questions or concerns, please don't hesitate to call.      Chilo Thomas PA-C

## 2024-11-13 NOTE — ED TRIAGE NOTES
Pt states yesterday she had a panic attack and is feeling anxious. Pt states this is the time of year that her son passed away so she has been emotional. Pt also c/o headache and feeling like her heart is racing.

## 2024-11-13 NOTE — ED TRIAGE NOTES
" TRIAGE NOTE   I saw the patient as the Clinician in Triage and performed a brief history and physical exam, established acuity, and ordered appropriate tests to develop basic plan of care. Patient will be seen by an [facility], resident and/or physician who will independently evaluate the patient. Please see subsequent provider notes for further details and disposition.     Brief HPI: In brief, Cee Jerez is a 39 y.o. female that presents for \"panic attack\".  Patient is new to this area does not have a therapist or PCP.  States she has been history of anxiety and depression on no current anxiety or depression meds.  Denies suicidal homicidal thoughts or feelings.  She has been emotionally upset for a week.  Today she had chest pain or shortness of breath at 5 PM.  Chest pain lasted for 30 minutes.  Now resolved.  No pleuritic pain.  No cough or fever.  No nausea vomiting or diaphoresis.  She is not on birth control pills.  Non-smoker.  No recent travel history.  Denies pregnancy last menses in the last 1 to 2 weeks.  She is treated for hypertension migraines.  Has a history of pituitary adenoma for which she is under neurology observation.  No history of thyroid disease or diabetes..     Focused Physical exam:   Blood pressure elevated.  No tachycardia or tachypnea.  No hypoxia or oxygen requirement.  Pulse ox 100%.  Denies active chest pain.  No pleuritic pain elicited.  Patient has emotional stressors.  No headache.  Normal neurological exam.  Nontoxic alert and coherent.  Ambulating Independently    Plan/MDM:   Workup initiated.  EKG sinus rhythm rate 85.  No STEMI.  PERC negative.  Stable pending bed availability and further evaluation.  Please see subsequent provider note for further details and disposition   "

## 2024-11-13 NOTE — ED PROVIDER NOTES
HPI   Chief Complaint   Patient presents with    Anxiety       39-year-old female presents with a complaint of anxiety/grief/adjustment reaction.  Unfortunately she states that her son  a year ago and coming up on the year anniversary if she is having a hard time where she had to leave work today.  She has recently moved here from [place] and is not established with primary care counseling.  States she would like a work note as well.  She denies SI or HI or AVH.  She feels better since she initially arrived but admits she was having symptoms of panic causing chest discomfort and headache              Patient History   History reviewed. No pertinent past medical history.  Past Surgical History:   Procedure Laterality Date    CT ANGIO NECK  2023    CT NECK ANGIO W AND WO IV CONTRAST 2023    CT HEAD ANGIO W AND WO IV CONTRAST  2023    CT HEAD ANGIO W AND WO IV CONTRAST 2023    MR HEAD ANGIO W IV CONTRAST  3/19/2023    MR HEAD ANGIO W IV CONTRAST 3/19/2023     No family history on file.  Social History     Tobacco Use    Smoking status: Not on file    Smokeless tobacco: Not on file   Substance Use Topics    Alcohol use: Not on file    Drug use: Not on file       Physical Exam   ED Triage Vitals [24 1935]   Temperature Heart Rate Respirations BP   37.1 °C (98.7 °F) 90 18 (!) 156/96      Pulse Ox Temp Source Heart Rate Source Patient Position   100 % Temporal Monitor Sitting      BP Location FiO2 (%)     Left arm --       Physical Exam  Vitals and nursing note reviewed.   Constitutional:       General: She is not in acute distress.     Appearance: Normal appearance. She is normal weight. She is not ill-appearing, toxic-appearing or diaphoretic.   HENT:      Head: Normocephalic and atraumatic.      Right Ear: External ear normal.      Left Ear: External ear normal.      Nose: Nose normal.      Mouth/Throat:      Mouth: Mucous membranes are moist.      Pharynx: No oropharyngeal exudate or  posterior oropharyngeal erythema.   Eyes:      General: No scleral icterus.        Right eye: No discharge.         Left eye: No discharge.      Extraocular Movements: Extraocular movements intact.      Conjunctiva/sclera: Conjunctivae normal.      Pupils: Pupils are equal, round, and reactive to light.   Cardiovascular:      Rate and Rhythm: Normal rate and regular rhythm.      Heart sounds: No murmur heard.  Pulmonary:      Effort: Pulmonary effort is normal. No respiratory distress.      Breath sounds: No stridor.   Abdominal:      General: There is no distension.      Palpations: There is no mass.      Tenderness: There is no right CVA tenderness, left CVA tenderness, guarding or rebound.   Musculoskeletal:         General: No swelling, tenderness, deformity or signs of injury.      Cervical back: Normal range of motion. No tenderness.   Skin:     General: Skin is warm.      Capillary Refill: Capillary refill takes less than 2 seconds.      Coloration: Skin is not jaundiced.      Findings: No bruising or rash.   Neurological:      General: No focal deficit present.      Mental Status: She is alert and oriented to person, place, and time. Mental status is at baseline.      Sensory: No sensory deficit.      Motor: No weakness.      Coordination: Coordination normal.   Psychiatric:         Attention and Perception: Attention and perception normal.         Mood and Affect: Mood is depressed. Affect is tearful.         Speech: Speech normal.         Behavior: Behavior normal.         Thought Content: Thought content normal.         Judgment: Judgment normal.           ED Course & MDM   Diagnoses as of 11/13/24 0328   Grief reaction   Anxiety   Adjustment disorder, unspecified type                 No data recorded                                 Medical Decision Making  DDX adjustment, grief,   -si or hi or avh    Bp elevated, didn't take meds today    Basic labs are unremarkable patient was given hydroxyzine which  helped plan to discharge with same referred to primary care.  Work note provided.  Understands return precautions    Amount and/or Complexity of Data Reviewed  ECG/medicine tests: independent interpretation performed.     Details: 85 bpm nsr no st elevation or depression.         Procedure  Procedures     Chilo Thomas PA-C  11/13/24 0341       Chilo Thomas PA-C  11/13/24 0341

## 2024-12-12 ENCOUNTER — HOSPITAL ENCOUNTER (EMERGENCY)
Facility: HOSPITAL | Age: 39
Discharge: HOME | End: 2024-12-13
Attending: EMERGENCY MEDICINE
Payer: COMMERCIAL

## 2024-12-12 DIAGNOSIS — R42 LIGHTHEADEDNESS: Primary | ICD-10-CM

## 2024-12-12 PROCEDURE — 93005 ELECTROCARDIOGRAM TRACING: CPT

## 2024-12-12 PROCEDURE — 99284 EMERGENCY DEPT VISIT MOD MDM: CPT | Performed by: EMERGENCY MEDICINE

## 2024-12-12 ASSESSMENT — PAIN - FUNCTIONAL ASSESSMENT: PAIN_FUNCTIONAL_ASSESSMENT: 0-10

## 2024-12-12 ASSESSMENT — PAIN DESCRIPTION - LOCATION: LOCATION: HEAD

## 2024-12-12 ASSESSMENT — PAIN SCALES - GENERAL: PAINLEVEL_OUTOF10: 5 - MODERATE PAIN

## 2024-12-12 NOTE — Clinical Note
Cee Jerez was seen and treated in our emergency department on 12/12/2024.  She may return to work on 12/15/2024.       If you have any questions or concerns, please don't hesitate to call.      Edy Albert MD

## 2024-12-13 ENCOUNTER — HOSPITAL ENCOUNTER (OUTPATIENT)
Dept: CARDIOLOGY | Facility: HOSPITAL | Age: 39
Discharge: HOME | End: 2024-12-13
Payer: COMMERCIAL

## 2024-12-13 VITALS
RESPIRATION RATE: 16 BRPM | BODY MASS INDEX: 34.95 KG/M2 | WEIGHT: 178 LBS | DIASTOLIC BLOOD PRESSURE: 89 MMHG | HEART RATE: 84 BPM | SYSTOLIC BLOOD PRESSURE: 141 MMHG | TEMPERATURE: 97.8 F | OXYGEN SATURATION: 99 % | HEIGHT: 60 IN

## 2024-12-13 LAB
ALBUMIN SERPL BCP-MCNC: 3.5 G/DL (ref 3.4–5)
ALP SERPL-CCNC: 56 U/L (ref 33–110)
ALT SERPL W P-5'-P-CCNC: 13 U/L (ref 7–45)
ANION GAP SERPL CALC-SCNC: 10 MMOL/L (ref 10–20)
AST SERPL W P-5'-P-CCNC: 19 U/L (ref 9–39)
BASOPHILS # BLD AUTO: 0.04 X10*3/UL (ref 0–0.1)
BASOPHILS NFR BLD AUTO: 0.3 %
BILIRUB SERPL-MCNC: 0.5 MG/DL (ref 0–1.2)
BUN SERPL-MCNC: 12 MG/DL (ref 6–23)
CALCIUM SERPL-MCNC: 8.2 MG/DL (ref 8.6–10.3)
CHLORIDE SERPL-SCNC: 109 MMOL/L (ref 98–107)
CO2 SERPL-SCNC: 23 MMOL/L (ref 21–32)
CREAT SERPL-MCNC: 0.73 MG/DL (ref 0.5–1.05)
EGFRCR SERPLBLD CKD-EPI 2021: >90 ML/MIN/1.73M*2
EOSINOPHIL # BLD AUTO: 0.13 X10*3/UL (ref 0–0.7)
EOSINOPHIL NFR BLD AUTO: 1.1 %
ERYTHROCYTE [DISTWIDTH] IN BLOOD BY AUTOMATED COUNT: 13.9 % (ref 11.5–14.5)
GLUCOSE SERPL-MCNC: 91 MG/DL (ref 74–99)
HCT VFR BLD AUTO: 39.7 % (ref 36–46)
HGB BLD-MCNC: 12.5 G/DL (ref 12–16)
IMM GRANULOCYTES # BLD AUTO: 0.03 X10*3/UL (ref 0–0.7)
IMM GRANULOCYTES NFR BLD AUTO: 0.3 % (ref 0–0.9)
LYMPHOCYTES # BLD AUTO: 4.13 X10*3/UL (ref 1.2–4.8)
LYMPHOCYTES NFR BLD AUTO: 34.5 %
MAGNESIUM SERPL-MCNC: 1.84 MG/DL (ref 1.6–2.4)
MCH RBC QN AUTO: 26.7 PG (ref 26–34)
MCHC RBC AUTO-ENTMCNC: 31.5 G/DL (ref 32–36)
MCV RBC AUTO: 85 FL (ref 80–100)
MONOCYTES # BLD AUTO: 0.6 X10*3/UL (ref 0.1–1)
MONOCYTES NFR BLD AUTO: 5 %
NEUTROPHILS # BLD AUTO: 7.05 X10*3/UL (ref 1.2–7.7)
NEUTROPHILS NFR BLD AUTO: 58.8 %
NRBC BLD-RTO: 0 /100 WBCS (ref 0–0)
PLATELET # BLD AUTO: 352 X10*3/UL (ref 150–450)
POTASSIUM SERPL-SCNC: 3.5 MMOL/L (ref 3.5–5.3)
PROT SERPL-MCNC: 6.2 G/DL (ref 6.4–8.2)
RBC # BLD AUTO: 4.68 X10*6/UL (ref 4–5.2)
SODIUM SERPL-SCNC: 138 MMOL/L (ref 136–145)
WBC # BLD AUTO: 12 X10*3/UL (ref 4.4–11.3)

## 2024-12-13 PROCEDURE — 83735 ASSAY OF MAGNESIUM: CPT | Performed by: EMERGENCY MEDICINE

## 2024-12-13 PROCEDURE — 36415 COLL VENOUS BLD VENIPUNCTURE: CPT | Performed by: EMERGENCY MEDICINE

## 2024-12-13 PROCEDURE — 80053 COMPREHEN METABOLIC PANEL: CPT | Performed by: EMERGENCY MEDICINE

## 2024-12-13 PROCEDURE — 85025 COMPLETE CBC W/AUTO DIFF WBC: CPT | Performed by: EMERGENCY MEDICINE

## 2024-12-13 ASSESSMENT — PAIN SCALES - GENERAL: PAINLEVEL_OUTOF10: 0 - NO PAIN

## 2024-12-13 NOTE — ED TRIAGE NOTES
Pt reports intermittent dizziness and headache starting 1 hour ago. Reports she has these symptoms when bp is elevated. Current bp 163/91

## 2024-12-13 NOTE — ED NOTES
The pt comes in for dizziness that started when her B/P gets high she starts having blurred vision. The pt takes lisinopril for her HTN.         Randolph Caldwell RN  12/13/24 0013

## 2024-12-13 NOTE — ED PROVIDER NOTES
HPI   Chief Complaint   Patient presents with   • Dizziness     Pt reports intermittent dizziness and headache starting 1 hour ago. Reports she has these symptoms when bp is elevated. Current bp 163/91       This is a 39-year-old female who presents to the Emergency Department complaining of an episode of dizziness.  Patient states that she has a pituitary adenoma and high blood pressure.  This leads to episodes of feeling lightheaded.  The patient was at work when she felt lightheaded.  Her job made her come to the emergency department for evaluation.  At the time of her assessment in the emergency department, she feels back to normal.  She reports that she takes lisinopril for her blood pressure but lately her blood pressure has been higher than normal.            Patient History   No past medical history on file.  Past Surgical History:   Procedure Laterality Date   • CT ANGIO NECK  6/13/2023    CT NECK ANGIO W AND WO IV CONTRAST 6/13/2023   • CT HEAD ANGIO W AND WO IV CONTRAST  6/13/2023    CT HEAD ANGIO W AND WO IV CONTRAST 6/13/2023   • MR HEAD ANGIO W IV CONTRAST  3/19/2023    MR HEAD ANGIO W IV CONTRAST 3/19/2023     No family history on file.  Social History     Tobacco Use   • Smoking status: Not on file   • Smokeless tobacco: Not on file   Substance Use Topics   • Alcohol use: Not on file   • Drug use: Not on file       Physical Exam   ED Triage Vitals [12/12/24 2346]   Temperature Heart Rate Respirations BP   36.6 °C (97.8 °F) 84 16 (!) 163/91      Pulse Ox Temp Source Heart Rate Source Patient Position   98 % Temporal Monitor --      BP Location FiO2 (%)     -- --       Physical Exam  Vitals and nursing note reviewed.   HENT:      Head: Normocephalic and atraumatic.      Nose: Nose normal.   Eyes:      Conjunctiva/sclera: Conjunctivae normal.   Cardiovascular:      Rate and Rhythm: Normal rate and regular rhythm.      Pulses: Normal pulses.      Heart sounds: Normal heart sounds.   Pulmonary:       Effort: Pulmonary effort is normal.      Breath sounds: Normal breath sounds.   Abdominal:      General: Bowel sounds are normal.      Palpations: Abdomen is soft.   Musculoskeletal:         General: Normal range of motion.      Cervical back: Normal range of motion and neck supple.   Skin:     Findings: No rash.   Neurological:      General: No focal deficit present.      Mental Status: She is alert and oriented to person, place, and time.   Psychiatric:         Mood and Affect: Mood normal.         ED Course & MDM   Diagnoses as of 12/13/24 0135   Lightheadedness                 No data recorded     [place] Coma Scale Score: 15 (12/12/24 2351 : Kaci Louie RN)                           Medical Decision Making  Differential diagnosis: I have considered the following conditions in my assessment of this patient:   Dizziness, vertigo, CVA or TIA, intracranial hemorrhage, electrolyte abnormality, dehydration, hypotension.    This is a 39-year-old female who presents to the emergency department complaining of an episode of feeling dizzy.  She reports feeling lightheaded.  She denies numbness or weakness in her arms or legs.  She denies chest pain.  She denies trouble breathing.  EKG is in a normal sinus rhythm without ischemia.  She will be further evaluated with labs.  Labs showed a mildly low calcium and protein.  White blood count was minimally elevated at 12.0.  There is no other evidence of infection.  The patient feels back to normal.  She is appropriate for discharge and outpatient follow-up.    Amount and/or Complexity of Data Reviewed  ECG/medicine tests: independent interpretation performed.     Details: Normal sinus rhythm, heart rate 77, normal axis, no ST elevation.        Procedure  Procedures     Edy Albert MD  12/13/24 0135

## 2024-12-15 LAB
ATRIAL RATE: 77 BPM
P AXIS: 71 DEGREES
P OFFSET: 186 MS
P ONSET: 139 MS
PR INTERVAL: 164 MS
Q ONSET: 221 MS
QRS COUNT: 13 BEATS
QRS DURATION: 74 MS
QT INTERVAL: 382 MS
QTC CALCULATION(BAZETT): 432 MS
QTC FREDERICIA: 415 MS
R AXIS: 40 DEGREES
T AXIS: 39 DEGREES
T OFFSET: 412 MS
VENTRICULAR RATE: 77 BPM

## 2025-06-01 ENCOUNTER — HOSPITAL ENCOUNTER (EMERGENCY)
Facility: HOSPITAL | Age: 40
Discharge: HOME | End: 2025-06-01
Attending: EMERGENCY MEDICINE
Payer: COMMERCIAL

## 2025-06-01 VITALS
TEMPERATURE: 98.8 F | DIASTOLIC BLOOD PRESSURE: 84 MMHG | HEART RATE: 95 BPM | SYSTOLIC BLOOD PRESSURE: 149 MMHG | OXYGEN SATURATION: 98 % | BODY MASS INDEX: 32.98 KG/M2 | HEIGHT: 60 IN | RESPIRATION RATE: 16 BRPM | WEIGHT: 168 LBS

## 2025-06-01 DIAGNOSIS — N39.0 URINARY TRACT INFECTION WITHOUT HEMATURIA, SITE UNSPECIFIED: Primary | ICD-10-CM

## 2025-06-01 LAB
APPEARANCE UR: ABNORMAL
BILIRUB UR STRIP.AUTO-MCNC: NEGATIVE MG/DL
COLOR UR: YELLOW
GLUCOSE UR STRIP.AUTO-MCNC: NORMAL MG/DL
HCG UR QL IA.RAPID: NEGATIVE
KETONES UR STRIP.AUTO-MCNC: ABNORMAL MG/DL
LEUKOCYTE ESTERASE UR QL STRIP.AUTO: ABNORMAL
MUCOUS THREADS #/AREA URNS AUTO: ABNORMAL /LPF
NITRITE UR QL STRIP.AUTO: NEGATIVE
PH UR STRIP.AUTO: 5.5 [PH]
PROT UR STRIP.AUTO-MCNC: ABNORMAL MG/DL
RBC # UR STRIP.AUTO: ABNORMAL MG/DL
RBC #/AREA URNS AUTO: ABNORMAL /HPF
SP GR UR STRIP.AUTO: 1.03
UROBILINOGEN UR STRIP.AUTO-MCNC: NORMAL MG/DL
WBC #/AREA URNS AUTO: >50 /HPF

## 2025-06-01 PROCEDURE — 2500000001 HC RX 250 WO HCPCS SELF ADMINISTERED DRUGS (ALT 637 FOR MEDICARE OP): Performed by: EMERGENCY MEDICINE

## 2025-06-01 PROCEDURE — 99283 EMERGENCY DEPT VISIT LOW MDM: CPT | Performed by: EMERGENCY MEDICINE

## 2025-06-01 PROCEDURE — 87086 URINE CULTURE/COLONY COUNT: CPT | Mod: AHULAB | Performed by: EMERGENCY MEDICINE

## 2025-06-01 PROCEDURE — 81001 URINALYSIS AUTO W/SCOPE: CPT | Performed by: EMERGENCY MEDICINE

## 2025-06-01 PROCEDURE — 81025 URINE PREGNANCY TEST: CPT | Performed by: EMERGENCY MEDICINE

## 2025-06-01 RX ORDER — CEPHALEXIN 500 MG/1
500 CAPSULE ORAL ONCE
Status: COMPLETED | OUTPATIENT
Start: 2025-06-01 | End: 2025-06-01

## 2025-06-01 RX ORDER — CEPHALEXIN 500 MG/1
500 CAPSULE ORAL 2 TIMES DAILY
Qty: 10 CAPSULE | Refills: 0 | Status: SHIPPED | OUTPATIENT
Start: 2025-06-01 | End: 2025-06-06

## 2025-06-01 RX ADMIN — CEPHALEXIN 500 MG: 500 CAPSULE ORAL at 20:37

## 2025-06-01 ASSESSMENT — PAIN - FUNCTIONAL ASSESSMENT: PAIN_FUNCTIONAL_ASSESSMENT: 0-10

## 2025-06-01 ASSESSMENT — COLUMBIA-SUICIDE SEVERITY RATING SCALE - C-SSRS
6. HAVE YOU EVER DONE ANYTHING, STARTED TO DO ANYTHING, OR PREPARED TO DO ANYTHING TO END YOUR LIFE?: NO
1. IN THE PAST MONTH, HAVE YOU WISHED YOU WERE DEAD OR WISHED YOU COULD GO TO SLEEP AND NOT WAKE UP?: NO
2. HAVE YOU ACTUALLY HAD ANY THOUGHTS OF KILLING YOURSELF?: NO

## 2025-06-01 ASSESSMENT — PAIN SCALES - GENERAL: PAINLEVEL_OUTOF10: 0 - NO PAIN

## 2025-06-01 NOTE — Clinical Note
Cee Jerez was seen and treated in our emergency department on 6/1/2025.  She may return to work on 06/02/2025.       If you have any questions or concerns, please don't hesitate to call.      Jim Valle MD

## 2025-06-01 NOTE — ED PROVIDER NOTES
History/Exam limitations: none.   Additional history was obtained from patient.          HPI:    Cee Jerez is a 39 y.o. female presenting for evaluation of dysuria and urinary frequency x 2 to 3 days.  Patient states she had onset of symptoms about 2 to 3 days ago.  No current abdominal or flank pain.  She states she is initially took Azo cranberry combination and felt slightly improved yesterday however today still having dysuria primarily burning at the end of urinary stream, urinary frequency.  States it feels similar to prior UTIs.  Is in a long-term monogamous relationship with her  and denies any new vaginal discharge or bleeding.           Physical Exam:  ED Triage Vitals [06/01/25 1809]   Temperature Heart Rate Respirations BP   37.1 °C (98.8 °F) 95 16 149/84      Pulse Ox Temp Source Heart Rate Source Patient Position   98 % Temporal Monitor --      BP Location FiO2 (%)     -- --        GEN:      Alert, NAD  HENT:      NC/AT, OP clear, airway patent, MM  CV:      RRR, no MRG  PULM:     CTAB, no w/r/r, easy WOB, symmetric chest rise  ABD:      Soft, NT, ND, no masses, BS +  :       No CVA TTP  NEURO:   A&Ox3, no focal deficits    MSK:      FROM, no joint deformities or swelling, no e/o trauma  SKIN:       Warm and dry  PSYCH:    Appropriate mood and affect         MDM/ED Course:   Cee Jerez is a 39 y.o. female presenting for evaluation of dysuria and urinary frequency x 2 to 3 days.  Vitals reassuring per exam as documented above.  Differential includes but is not limited to UTI, STI, pyelonephritis, glucosuria.  Low risk sexual history.  Low suspicion for pyelonephritis given no back pain CVA tenderness.  Patient's abdomen is soft nontender low sufficient for acute intra-abdominal surgical pathology.  Urinalysis was obtained and concerning for UTI with moderate leuk esterase, numerous white cells, red cells present as well.  hCG negative.  Plans made to treat with cephalexin first dose  given here in ED.  Patient remained hemodynamically stable and well-appearing.  Explained findings, exam/study results, the importance of follow up and the plan moving forward; patient and/or caregiver verbalized understanding and agreement. All questions were answered and no new questions at this time. Patient will be discharged with strict return precautions, follow up plan with PCP.     Diagnoses as of 06/03/25 1136   Urinary tract infection without hematuria, site unspecified         Chronic medical conditions affecting care listed in MDM. I independently reviewed imaging studies and agreed with radiology reads. I reviewed recent and relevant outside records including PCP notes, Prior discharge summaries, and prior radiology reports.    Procedure  Procedures    Diagnosis:   1.  UTI    Dispo: Discharged in stable condition      Disclaimer: Portions of this note were dictated by speech recognition. An attempt at proof reading was made to minimize errors. Minor errors in transcription may be present.  Please call if questions.     Jim Valle MD  06/03/25 113

## 2025-06-03 LAB — BACTERIA UR CULT: NORMAL
